# Patient Record
Sex: FEMALE
[De-identification: names, ages, dates, MRNs, and addresses within clinical notes are randomized per-mention and may not be internally consistent; named-entity substitution may affect disease eponyms.]

---

## 2020-09-24 ENCOUNTER — HOSPITAL ENCOUNTER (OUTPATIENT)
Dept: HOSPITAL 92 - SCSCT | Age: 30
Discharge: HOME | End: 2020-09-24
Attending: PHYSICIAN ASSISTANT
Payer: COMMERCIAL

## 2020-09-24 DIAGNOSIS — R10.12: Primary | ICD-10-CM

## 2020-09-24 DIAGNOSIS — K58.9: ICD-10-CM

## 2020-09-24 DIAGNOSIS — R59.0: ICD-10-CM

## 2020-09-24 PROCEDURE — 74177 CT ABD & PELVIS W/CONTRAST: CPT

## 2020-09-24 NOTE — CT
CT ABDOMEN AND PELVIS WITH IV CONTRAST

 9/24/2020



CLINICAL INFORMATION:

Left-sided abdominal pain over last 2 weeks. Left upper quadrant abdominal pain. Irritable bowel synd
violet.



COMPARISON:

 None.



Technique:

Multiple contiguous axial CT images are obtained through the abdomen and pelvis with IV contrast. Cor
onal reformatted images are provided.



FINDINGS:



Lower Chest: Lung bases are clear.

Vessels: Abdominal aorta is normal in caliber. 



Abdomen:

Portal vein:Patent

Gallbladder: Within normal limits for CT imaging.

Liver: within normal limits.

Spleen: within normal limits.

Pancreas: within normal limits.

Adrenals: within normal limits.

Kidneys: within normal limits.



Bowel: Normal caliber.

Appendix: The appendix is visualized and normal in caliber.



Peritoneum: Trace free fluid is seen in the pelvis which may be physiologic. No fluid collection is s
een in the abdomen or pelvis

Mesentery and Retroperitoneum: There is nonspecific increase in number of mesenteric, aortocaval, and
 iliac chain lymph nodes. A proximal right iliac chain lymph node measures 1.3 cm in short axis

dimension which is mildly enlarged.



Abdominal Wall: within normal limits.



Pelvis:

Reproductive Organs: No pelvic masses.

Bladder: Partially distended and normal in appearance.



Bones: Slightly sclerotic osseous density is seen in the proximal subtrochanteric region left proxima
l femur likely due to low-grade chondroid lesion.  



IMPRESSION:



1. Increased number of mesenteric, aortocaval, and iliac chain lymph nodes with mild enlargement of a
 proximal right external iliac chain lymph node measuring 1.3 cm in short axis dimension compatible

with lymphadenopathy. Exact etiology is uncertain. This could be reactive in origin. Neoplastic proce
ss such as lymphoma cannot be entirely excluded..

2. Prominence in the region of the cervix with heterogeneity of the uterus. While findings could be i
n part be related to phase of enhancement, further evaluation with pelvic ultrasound and direct

visualization of the cervix is recommended.

3. Trace free fluid in the pelvis. 

4. No CT evidence of appendicitis.



Reported By: Aristides Butcher 

Electronically Signed:  9/24/2020 10:51 AM